# Patient Record
Sex: FEMALE | Race: WHITE | NOT HISPANIC OR LATINO | URBAN - METROPOLITAN AREA
[De-identification: names, ages, dates, MRNs, and addresses within clinical notes are randomized per-mention and may not be internally consistent; named-entity substitution may affect disease eponyms.]

---

## 2017-12-13 ENCOUNTER — APPOINTMENT (RX ONLY)
Dept: URBAN - METROPOLITAN AREA CLINIC 134 | Facility: CLINIC | Age: 22
Setting detail: DERMATOLOGY
End: 2017-12-13

## 2017-12-13 DIAGNOSIS — Z41.9 ENCOUNTER FOR PROCEDURE FOR PURPOSES OTHER THAN REMEDYING HEALTH STATE, UNSPECIFIED: ICD-10-CM

## 2017-12-13 PROBLEM — L70.0 ACNE VULGARIS: Status: ACTIVE | Noted: 2017-12-13

## 2017-12-13 PROBLEM — F41.9 ANXIETY DISORDER, UNSPECIFIED: Status: ACTIVE | Noted: 2017-12-13

## 2017-12-13 PROCEDURE — ? MEDICAL CONSULTATION: CHEMICAL PEELS

## 2017-12-13 PROCEDURE — ? MEDICAL CONSULTATION: MICRODERMABRASION

## 2017-12-13 PROCEDURE — ? COSMETIC CONSULTATION: FILLERS

## 2018-01-03 ENCOUNTER — APPOINTMENT (RX ONLY)
Dept: URBAN - METROPOLITAN AREA CLINIC 12 | Facility: CLINIC | Age: 23
Setting detail: DERMATOLOGY
End: 2018-01-03

## 2018-01-03 DIAGNOSIS — Z41.9 ENCOUNTER FOR PROCEDURE FOR PURPOSES OTHER THAN REMEDYING HEALTH STATE, UNSPECIFIED: ICD-10-CM

## 2018-01-03 PROCEDURE — ? COSMETIC CONSULTATION: FILLERS

## 2018-01-03 PROCEDURE — ? FILLERS

## 2018-01-03 ASSESSMENT — LOCATION SIMPLE DESCRIPTION DERM
LOCATION SIMPLE: RIGHT LIP
LOCATION SIMPLE: LEFT LIP
LOCATION SIMPLE: LEFT LIP
LOCATION SIMPLE: RIGHT LIP

## 2018-01-03 ASSESSMENT — LOCATION ZONE DERM
LOCATION ZONE: LIP
LOCATION ZONE: LIP

## 2018-01-03 ASSESSMENT — LOCATION DETAILED DESCRIPTION DERM
LOCATION DETAILED: LEFT SUPERIOR VERMILION LIP
LOCATION DETAILED: RIGHT INFERIOR VERMILION LIP
LOCATION DETAILED: LEFT INFERIOR VERMILION LIP
LOCATION DETAILED: RIGHT SUPERIOR VERMILION LIP
LOCATION DETAILED: LEFT SUPERIOR VERMILION LIP
LOCATION DETAILED: LEFT INFERIOR VERMILION LIP
LOCATION DETAILED: RIGHT SUPERIOR VERMILION LIP
LOCATION DETAILED: RIGHT INFERIOR VERMILION LIP

## 2018-01-03 NOTE — PROCEDURE: FILLERS
Marionette Lines Filler  Volume In Cc: 0
Use Map Statement For Sites (Optional): Yes
Expiration Date (Month Year): 9/21/18
Lot #: [id number]
Additional Area 1 Location: chin
Map Statment: See attached Map
Price (Use Numbers Only, No Special Characters Or $): 550.00
Vermilion Lips Filler Volume In Cc: 1
Expiration Date (Month Year): 3/26/19
Filler: Juvederm Ultra XC
Lot #: [id number]
Additional Area 1 Location: Upper Cutaneous Lip
Include Cannula Information In Note?: No
Anesthesia Type: 0.2% lidocaine (mixed within filler)
Expiration Date (Month Year): 3/27/2018
Anesthesia Volume In Cc: 0.5
Detail Level: Simple
Lot #: [id number]

## 2018-12-19 ENCOUNTER — APPOINTMENT (RX ONLY)
Dept: URBAN - METROPOLITAN AREA CLINIC 12 | Facility: CLINIC | Age: 23
Setting detail: DERMATOLOGY
End: 2018-12-19

## 2018-12-19 DIAGNOSIS — Z41.9 ENCOUNTER FOR PROCEDURE FOR PURPOSES OTHER THAN REMEDYING HEALTH STATE, UNSPECIFIED: ICD-10-CM

## 2018-12-19 PROCEDURE — ? FILLERS

## 2018-12-19 ASSESSMENT — LOCATION DETAILED DESCRIPTION DERM
LOCATION DETAILED: RIGHT INFERIOR VERMILION LIP
LOCATION DETAILED: RIGHT SUPERIOR VERMILION LIP
LOCATION DETAILED: LEFT INFERIOR VERMILION LIP
LOCATION DETAILED: LEFT SUPERIOR VERMILION LIP

## 2018-12-19 ASSESSMENT — LOCATION SIMPLE DESCRIPTION DERM
LOCATION SIMPLE: LEFT LIP
LOCATION SIMPLE: RIGHT LIP

## 2018-12-19 ASSESSMENT — LOCATION ZONE DERM: LOCATION ZONE: LIP

## 2018-12-19 NOTE — PROCEDURE: FILLERS
Additional Area 4 Location: [place]
Decollete Filler  Volume In Cc: 0
Include Cannula Information In Note?: No
Additional Area 1 Location: Upper Cutaneous Lip
Price (Use Numbers Only, No Special Characters Or $): 550.00
Use Map Statement For Sites (Optional): Yes
Additional Area 2 Location: Chin
Vermilion Lips Filler Volume In Cc: 1
Lot #: [id number]
Anesthesia Type: 0.2% lidocaine (mixed within filler)
Expiration Date (Month Year): 7/28/19
Map Statment: See attached Map
Anesthesia Volume In Cc: 0.3
Lot #: [id number]
Expiration Date (Month Year): 8/14/19
Lot #: [id number]
Additional Area 2 Location: Forehead
Expiration Date (Month Year): 12/16/2019
Detail Level: Simple
Filler: Juvederm Ultra XC
Additional Area 3 Location: hands